# Patient Record
Sex: FEMALE | URBAN - METROPOLITAN AREA
[De-identification: names, ages, dates, MRNs, and addresses within clinical notes are randomized per-mention and may not be internally consistent; named-entity substitution may affect disease eponyms.]

---

## 2019-06-24 ENCOUNTER — TRANSFERRED RECORDS (OUTPATIENT)
Dept: HEALTH INFORMATION MANAGEMENT | Facility: CLINIC | Age: 34
End: 2019-06-24

## 2019-06-24 ENCOUNTER — MEDICAL CORRESPONDENCE (OUTPATIENT)
Dept: HEALTH INFORMATION MANAGEMENT | Facility: CLINIC | Age: 34
End: 2019-06-24

## 2019-06-25 ENCOUNTER — PRE VISIT (OUTPATIENT)
Dept: OPHTHALMOLOGY | Facility: CLINIC | Age: 34
End: 2019-06-25

## 2019-06-25 NOTE — TELEPHONE ENCOUNTER
FUTURE VISIT INFORMATION      FUTURE VISIT INFORMATION:    Date: 7/16/19    Time: 1:45am    Location: [facility] [place]  REFERRAL INFORMATION:    Referring provider:  Dr. Cuenca    Referring providers clinic:  [facility]    Reason for visit/diagnosis  BILATERAL STYE  RECORDS REQUESTED FROM:       Clinic name Comments Records Status Imaging Status   [facility] Records received and sent to scanning EPIC

## 2019-07-16 ENCOUNTER — OFFICE VISIT (OUTPATIENT)
Dept: OPHTHALMOLOGY | Facility: CLINIC | Age: 34
End: 2019-07-16
Payer: COMMERCIAL

## 2019-07-16 DIAGNOSIS — H00.19 CHALAZION: Primary | ICD-10-CM

## 2019-07-16 RX ORDER — LIDOCAINE HYDROCHLORIDE AND EPINEPHRINE 10; 10 MG/ML; UG/ML
1 INJECTION, SOLUTION INFILTRATION; PERINEURAL ONCE
Status: COMPLETED | OUTPATIENT
Start: 2019-07-16 | End: 2019-07-16

## 2019-07-16 RX ORDER — ERYTHROMYCIN 5 MG/G
OINTMENT OPHTHALMIC ONCE
Status: COMPLETED | OUTPATIENT
Start: 2019-07-16 | End: 2019-07-16

## 2019-07-16 RX ADMIN — LIDOCAINE HYDROCHLORIDE AND EPINEPHRINE 1 ML: 10; 10 INJECTION, SOLUTION INFILTRATION; PERINEURAL at 13:37

## 2019-07-16 RX ADMIN — ERYTHROMYCIN: 5 OINTMENT OPHTHALMIC at 13:36

## 2019-07-16 ASSESSMENT — VISUAL ACUITY
OS_SC+: -1
OS_SC: 20/20
OD_SC+: -1
OD_SC: 20/20
METHOD: SNELLEN - LINEAR

## 2019-07-16 ASSESSMENT — CONF VISUAL FIELD
METHOD: COUNTING FINGERS
OS_NORMAL: 1
OD_NORMAL: 1

## 2019-07-16 ASSESSMENT — TONOMETRY
IOP_METHOD: ICARE
OS_IOP_MMHG: 12
OD_IOP_MMHG: 12

## 2019-07-16 NOTE — PROGRESS NOTES
Chief Complaint(s) and History of Present Illness(es)     Chalazion Evaluation     Laterality: right lower lid and left lower lid    Onset: 3 months ago    Course: gradually worsening    Associated symptoms: lid swelling    Treatments tried: warm compresses    Response to treatment: no improvement    Pain scale: 0/10              Comments     Amelia Whiting is being seen today at the request of  Sissy Cuenca for   Stye both eyes.   Pt states styes have been present for several months on both lower lids.   Interested in drainage today.     Sylwia Hernandez COT 1:13 PM July 16, 2019          Assessment & Plan     Amelia Whiting is a 33 year old female with the following diagnoses:   1. Chalazion       2 large chalazia Right lower lid and 2 large left lower lid     Plan   Bilateral lower lids incision and drainage of chalazion - 2 each lid         Attending Physician Attestation:  Complete documentation of historical and exam elements from today's encounter can be found in the full encounter summary report (not reduplicated in this progress note).  I personally obtained the chief complaint(s) and history of present illness.  I confirmed and edited as necessary the review of systems, past medical/surgical history, family history, social history, and examination findings as documented by others; and I examined the patient myself.  I personally reviewed the relevant tests, images, and reports as documented above.  I formulated and edited as necessary the assessment and plan and discussed the findings and management plan with the patient and family. - Bhavik Betancourt MD    OPERATIVE NOTE: CHALAZION.    PRE-OPERATIVE DIAGNOSIS: Chalazion right lower lid and left lower lid.    POST-OPERATIVE DIAGNOSIS: Chalazion right lower lid and left lower lid.    PROCEDURE PERFORMED: Incision and drainage of chalazion right lower lid and left lower lid.    SURGEON: Bhavik Betancourt    ANESTHESIA: Local infiltration with 2% lidocaine with  epinephrine.    COMPLICATIONS: None    ESTIMATED BLOOD LOSS:  <5mL    HISTORY AND INDICATIONS: Patient presented with an enlarging chalazion in the involved eyelid.  This failed conservative medical management.  After the risks, benefits and alternatives of the proposed procedure were explained, informed consent was obtained.     PROCEDURE: Patient was brought to the minor procedure room and placed supine on the operating table.  Anesthesia was as listed above.  The area was prepped and draped in the typical fashion.  Starting with the right lower lid, a chalazion clamp was placed over the involved eyelid and the eyelid everted.  A crutiate incision was made over the chalazion and the lipogranulomatous material removed using the chalazion curette.  Scissors were used to break any septae.  The edges of the cruciate incision were excised using Jung scissors.  Hemostasis was obtained. There was a second chalazion immediately adjacent to the first, and the same procedure was performed for that one. The lid was reverted to its normal position, the clamp removed, and the erythromycin antibiotic ointment applied. Attention was directed to the left lower eyelid where the same procedure was again performed - again with two chalazia on that lid. The patient tolerated the procedure well and left in stable condition with a tube of antibiotic ointment.     I was present for the entire procedure. Bhavik Betancourt

## 2019-07-16 NOTE — LETTER
2019         RE:  :  MRN: Amelia Whiting  1985  [id number]     Dear Dr. Cuenca,    Thank you for asking me to see your patient, Amelia Whiting, for an oculoplastic   consultation.  My assessment and plan are below.  For further details, please see my attached clinic note.           Chief Complaint(s) and History of Present Illness(es)     Chalazion Evaluation     Laterality: right lower lid and left lower lid    Onset: 3 months ago    Course: gradually worsening    Associated symptoms: lid swelling    Treatments tried: warm compresses    Response to treatment: no improvement    Pain scale: 0/10              Comments     Amelia Whiting is being seen today at the request of  Betsy Cuenca for   Stye both eyes.   Pt states styes have been present for several months on both lower lids.   Interested in drainage today.     Sylwia MATTHEWS 1:13 PM 2019          Assessment & Plan     Amelia Whiting is a 33 year old female with the following diagnoses:   1. Chalazion       2 large chalazia Right lower lid and 2 large left lower lid     Plan   Bilateral lower lids incision and drainage of chalazion - 2 each lid        Again, thank you for allowing me to participate in the care of your patient.      Sincerely,    Bhavik Betancourt MD  Department of Ophthalmology and Visual Neurosciences  [facility]    CC: BETSY CUENCA  [address] [address]  VIA Facsimile: [phone]

## 2019-07-16 NOTE — NURSING NOTE
Chief Complaints and History of Present Illnesses   Patient presents with     Chalazion Evaluation     Chief Complaint(s) and History of Present Illness(es)     Chalazion Evaluation     Laterality: right lower lid and left lower lid    Onset: 3 months ago    Course: gradually worsening    Associated symptoms: lid swelling    Treatments tried: warm compresses    Response to treatment: no improvement    Pain scale: 0/10              Comments     Amelia Whiting is being seen today at the request of  Sissy Cuenca for Stye both eyes.   Pt states styes have been present for several months on both lower lids. Interested in drainage today.     Sylwia Hernandez COT 1:13 PM July 16, 2019

## 2019-10-01 ENCOUNTER — TRANSFERRED RECORDS (OUTPATIENT)
Dept: HEALTH INFORMATION MANAGEMENT | Facility: CLINIC | Age: 34
End: 2019-10-01

## 2019-10-02 ENCOUNTER — PRE VISIT (OUTPATIENT)
Dept: OPHTHALMOLOGY | Facility: CLINIC | Age: 34
End: 2019-10-02

## 2019-10-02 NOTE — TELEPHONE ENCOUNTER
FUTURE VISIT INFORMATION      FUTURE VISIT INFORMATION:    Date: 10/15/19    Time: 3:30pm    Location: [facility] [place]  REFERRAL INFORMATION:    Referring providers clinic:  [facility]      RECORDS REQUESTED FROM:       Clinic name Comments Records Status Imaging Status   [facility]  recs received and scanned into chart EPIC

## 2019-10-14 ENCOUNTER — TELEPHONE (OUTPATIENT)
Dept: OPHTHALMOLOGY | Facility: CLINIC | Age: 34
End: 2019-10-14

## 2019-10-14 NOTE — TELEPHONE ENCOUNTER
Cancelled per patient request.  Patient will call back to schedule.      Elida Wei on 10/14/2019 at 2:31 PM

## 2020-03-11 ENCOUNTER — HEALTH MAINTENANCE LETTER (OUTPATIENT)
Age: 35
End: 2020-03-11

## 2021-01-03 ENCOUNTER — HEALTH MAINTENANCE LETTER (OUTPATIENT)
Age: 36
End: 2021-01-03

## 2021-04-25 ENCOUNTER — HEALTH MAINTENANCE LETTER (OUTPATIENT)
Age: 36
End: 2021-04-25

## 2021-10-10 ENCOUNTER — HEALTH MAINTENANCE LETTER (OUTPATIENT)
Age: 36
End: 2021-10-10

## 2022-05-21 ENCOUNTER — HEALTH MAINTENANCE LETTER (OUTPATIENT)
Age: 37
End: 2022-05-21

## 2022-09-18 ENCOUNTER — HEALTH MAINTENANCE LETTER (OUTPATIENT)
Age: 37
End: 2022-09-18

## 2023-06-04 ENCOUNTER — HEALTH MAINTENANCE LETTER (OUTPATIENT)
Age: 38
End: 2023-06-04

## (undated) RX ORDER — ERYTHROMYCIN 5 MG/G
OINTMENT OPHTHALMIC
Status: DISPENSED
Start: 2019-07-16

## (undated) RX ORDER — LIDOCAINE HYDROCHLORIDE AND EPINEPHRINE 10; 10 MG/ML; UG/ML
INJECTION, SOLUTION INFILTRATION; PERINEURAL
Status: DISPENSED
Start: 2019-07-16